# Patient Record
Sex: FEMALE | Race: OTHER | HISPANIC OR LATINO
[De-identification: names, ages, dates, MRNs, and addresses within clinical notes are randomized per-mention and may not be internally consistent; named-entity substitution may affect disease eponyms.]

---

## 2021-09-02 ENCOUNTER — MED ADMIN CHARGE (OUTPATIENT)
Age: 21
End: 2021-09-02

## 2021-09-02 ENCOUNTER — NON-APPOINTMENT (OUTPATIENT)
Age: 21
End: 2021-09-02

## 2021-09-02 ENCOUNTER — APPOINTMENT (OUTPATIENT)
Dept: INTERNAL MEDICINE | Facility: CLINIC | Age: 21
End: 2021-09-02
Payer: MEDICAID

## 2021-09-02 VITALS
BODY MASS INDEX: 29.01 KG/M2 | HEIGHT: 64 IN | DIASTOLIC BLOOD PRESSURE: 76 MMHG | OXYGEN SATURATION: 100 % | RESPIRATION RATE: 16 BRPM | WEIGHT: 169.94 LBS | HEART RATE: 83 BPM | TEMPERATURE: 98.6 F | SYSTOLIC BLOOD PRESSURE: 115 MMHG

## 2021-09-02 DIAGNOSIS — Z23 ENCOUNTER FOR IMMUNIZATION: ICD-10-CM

## 2021-09-02 DIAGNOSIS — J45.20 MILD INTERMITTENT ASTHMA, UNCOMPLICATED: ICD-10-CM

## 2021-09-02 DIAGNOSIS — Z88.9 ALLERGY STATUS TO UNSPECIFIED DRUGS, MEDICAMENTS AND BIOLOGICAL SUBSTANCES: ICD-10-CM

## 2021-09-02 DIAGNOSIS — D64.9 ANEMIA, UNSPECIFIED: ICD-10-CM

## 2021-09-02 DIAGNOSIS — Z82.49 FAMILY HISTORY OF ISCHEMIC HEART DISEASE AND OTHER DISEASES OF THE CIRCULATORY SYSTEM: ICD-10-CM

## 2021-09-02 DIAGNOSIS — Z86.2 PERSONAL HISTORY OF DISEASES OF THE BLOOD AND BLOOD-FORMING ORGANS AND CERTAIN DISORDERS INVOLVING THE IMMUNE MECHANISM: ICD-10-CM

## 2021-09-02 PROCEDURE — 99385 PREV VISIT NEW AGE 18-39: CPT | Mod: GC,25

## 2021-09-02 PROCEDURE — 90471 IMMUNIZATION ADMIN: CPT

## 2021-09-02 PROCEDURE — 90651 9VHPV VACCINE 2/3 DOSE IM: CPT

## 2021-09-02 RX ORDER — ALBUTEROL 90 MCG
90 AEROSOL (GRAM) INHALATION
Refills: 0 | Status: ACTIVE | COMMUNITY

## 2021-09-02 NOTE — ASSESSMENT
[FreeTextEntry1] : 21 year old Lithuanian Female with a past medical history of asthma (never hospitalized or intubated), allergies, anemia  who presents to the clinic to establish care.

## 2021-09-02 NOTE — HISTORY OF PRESENT ILLNESS
[FreeTextEntry1] : Establish Care  [de-identified] : The patient is a 21 year old Pakistani Female with a past medical history of asthma (never hospitalized or intubated), allergies, anemia  who presents to the clinic to establish care.  The patient expresses that her asthma is well controlled and has not used in an inhaler in five months.  The patient states that her diet is, "balanced" with meats, fruits, vegetables, and is western in nature.  The patient does not exercise, but routinely did say COVID happened. The patient has stopped vaping two months ago, but had been vaping once a week prior to that for one and half years. She denies alcohol, or illicit drug use. Ms. Bright is sexually active with one partner and uses barrier contraceptives consistently.   She otherwise report to be in good health with no additional complaints, however states that after walking ten blocks she starts to feel short of breath.

## 2021-09-02 NOTE — ASSESSMENT
[FreeTextEntry1] : 21 year old Macedonian Female with a past medical history of asthma (never hospitalized or intubated), allergies, anemia  who presents to the clinic to establish care.

## 2021-09-02 NOTE — HISTORY OF PRESENT ILLNESS
[FreeTextEntry1] : Establish Care  [de-identified] : The patient is a 21 year old Romanian Female with a past medical history of asthma (never hospitalized or intubated), allergies, anemia  who presents to the clinic to establish care.  The patient expresses that her asthma is well controlled and has not used in an inhaler in five months.  The patient states that her diet is, "balanced" with meats, fruits, vegetables, and is western in nature.  The patient does not exercise, but routinely did say COVID happened. The patient has stopped vaping two months ago, but had been vaping once a week prior to that for one and half years. She denies alcohol, or illicit drug use. Ms. Bright is sexually active with one partner and uses barrier contraceptives consistently.   She otherwise report to be in good health with no additional complaints, however states that after walking ten blocks she starts to feel short of breath.

## 2021-09-02 NOTE — END OF VISIT
[FreeTextEntry3] : I saw and evaluated the patient. The findings and assessment were discussed with the resident and I agree with resident’s plan as documented in the above, in the resident’s note.\par \par Pertinent exam findings: Well-appearing, NAD. \par \par Asthma: Good exercise tolerance. Recently stopped vaping. Start Advair as control med. C/w albuterol PRN\par Anemia: ?Fe def. CBC and iron studies today\par Overweight: CMP today\par Low risk sexual activity\par Vaccines: UTD on MMR, Hep A/B, COVID. HPV #1 today\par \par RTC 2 months for HPV #2.

## 2021-09-07 LAB
ALBUMIN SERPL ELPH-MCNC: 5.1 G/DL
ALP BLD-CCNC: 82 U/L
ALT SERPL-CCNC: 38 U/L
ANION GAP SERPL CALC-SCNC: 14 MMOL/L
AST SERPL-CCNC: 30 U/L
BASOPHILS # BLD AUTO: 0.03 K/UL
BASOPHILS NFR BLD AUTO: 0.5 %
BILIRUB SERPL-MCNC: 0.5 MG/DL
BUN SERPL-MCNC: 12 MG/DL
CALCIUM SERPL-MCNC: 9.8 MG/DL
CHLORIDE SERPL-SCNC: 104 MMOL/L
CO2 SERPL-SCNC: 26 MMOL/L
CREAT SERPL-MCNC: 0.83 MG/DL
EOSINOPHIL # BLD AUTO: 0.13 K/UL
EOSINOPHIL NFR BLD AUTO: 2 %
FERRITIN SERPL-MCNC: 68 NG/ML
GLUCOSE SERPL-MCNC: 94 MG/DL
HCT VFR BLD CALC: 43 %
HGB BLD-MCNC: 13.9 G/DL
HIV1+2 AB SPEC QL IA.RAPID: NONREACTIVE
IMM GRANULOCYTES NFR BLD AUTO: 0.2 %
IRON SATN MFR SERPL: 20 %
IRON SERPL-MCNC: 77 UG/DL
LYMPHOCYTES # BLD AUTO: 2.05 K/UL
LYMPHOCYTES NFR BLD AUTO: 32.2 %
MAN DIFF?: NORMAL
MCHC RBC-ENTMCNC: 28.9 PG
MCHC RBC-ENTMCNC: 32.3 GM/DL
MCV RBC AUTO: 89.4 FL
MONOCYTES # BLD AUTO: 0.58 K/UL
MONOCYTES NFR BLD AUTO: 9.1 %
N GONORRHOEA RRNA SPEC QL NAA+PROBE: NOT DETECTED
NEUTROPHILS # BLD AUTO: 3.57 K/UL
NEUTROPHILS NFR BLD AUTO: 56 %
PLATELET # BLD AUTO: 367 K/UL
POTASSIUM SERPL-SCNC: 4.5 MMOL/L
PROT SERPL-MCNC: 7.8 G/DL
RBC # BLD: 4.81 M/UL
RBC # FLD: 13.5 %
SODIUM SERPL-SCNC: 144 MMOL/L
SOURCE AMPLIFICATION: NORMAL
TIBC SERPL-MCNC: 376 UG/DL
TRANSFERRIN SERPL-MCNC: 290 MG/DL
UIBC SERPL-MCNC: 299 UG/DL
WBC # FLD AUTO: 6.37 K/UL

## 2022-04-07 ENCOUNTER — APPOINTMENT (OUTPATIENT)
Dept: INTERNAL MEDICINE | Facility: CLINIC | Age: 22
End: 2022-04-07

## 2022-04-08 ENCOUNTER — APPOINTMENT (OUTPATIENT)
Dept: INTERNAL MEDICINE | Facility: CLINIC | Age: 22
End: 2022-04-08
Payer: MEDICAID

## 2022-04-08 VITALS
SYSTOLIC BLOOD PRESSURE: 111 MMHG | OXYGEN SATURATION: 98 % | BODY MASS INDEX: 29.02 KG/M2 | HEIGHT: 64 IN | RESPIRATION RATE: 14 BRPM | WEIGHT: 170 LBS | HEART RATE: 82 BPM | TEMPERATURE: 98.5 F | DIASTOLIC BLOOD PRESSURE: 73 MMHG

## 2022-04-08 DIAGNOSIS — J30.2 OTHER SEASONAL ALLERGIC RHINITIS: ICD-10-CM

## 2022-04-08 DIAGNOSIS — J45.909 UNSPECIFIED ASTHMA, UNCOMPLICATED: ICD-10-CM

## 2022-04-08 PROCEDURE — 99213 OFFICE O/P EST LOW 20 MIN: CPT | Mod: GC

## 2022-10-04 ENCOUNTER — APPOINTMENT (OUTPATIENT)
Dept: INTERNAL MEDICINE | Facility: CLINIC | Age: 22
End: 2022-10-04

## 2022-11-21 ENCOUNTER — APPOINTMENT (OUTPATIENT)
Dept: INTERNAL MEDICINE | Facility: CLINIC | Age: 22
End: 2022-11-21

## 2022-11-21 ENCOUNTER — RESULT CHARGE (OUTPATIENT)
Age: 22
End: 2022-11-21

## 2022-11-21 VITALS
HEIGHT: 64 IN | RESPIRATION RATE: 18 BRPM | HEART RATE: 64 BPM | OXYGEN SATURATION: 100 % | TEMPERATURE: 98.3 F | WEIGHT: 161 LBS | BODY MASS INDEX: 27.49 KG/M2 | DIASTOLIC BLOOD PRESSURE: 75 MMHG | SYSTOLIC BLOOD PRESSURE: 118 MMHG

## 2022-11-21 DIAGNOSIS — Z83.3 FAMILY HISTORY OF DIABETES MELLITUS: ICD-10-CM

## 2022-11-21 DIAGNOSIS — Z80.9 FAMILY HISTORY OF MALIGNANT NEOPLASM, UNSPECIFIED: ICD-10-CM

## 2022-11-21 DIAGNOSIS — Z80.42 FAMILY HISTORY OF MALIGNANT NEOPLASM OF PROSTATE: ICD-10-CM

## 2022-11-21 DIAGNOSIS — R42 DIZZINESS AND GIDDINESS: ICD-10-CM

## 2022-11-21 DIAGNOSIS — Z00.01 ENCOUNTER FOR GENERAL ADULT MEDICAL EXAMINATION WITH ABNORMAL FINDINGS: ICD-10-CM

## 2022-11-21 DIAGNOSIS — Z23 ENCOUNTER FOR IMMUNIZATION: ICD-10-CM

## 2022-11-21 DIAGNOSIS — Z12.4 ENCOUNTER FOR SCREENING FOR MALIGNANT NEOPLASM OF CERVIX: ICD-10-CM

## 2022-11-21 PROCEDURE — 90472 IMMUNIZATION ADMIN EACH ADD: CPT

## 2022-11-21 PROCEDURE — 99212 OFFICE O/P EST SF 10 MIN: CPT | Mod: 25,GC

## 2022-11-21 PROCEDURE — 90649 4VHPV VACCINE 3 DOSE IM: CPT

## 2022-11-21 PROCEDURE — 99395 PREV VISIT EST AGE 18-39: CPT | Mod: 25

## 2022-11-21 PROCEDURE — 81025 URINE PREGNANCY TEST: CPT

## 2022-11-21 PROCEDURE — 90686 IIV4 VACC NO PRSV 0.5 ML IM: CPT

## 2022-11-21 PROCEDURE — G0008: CPT

## 2022-11-21 RX ORDER — FLUTICASONE PROPIONATE AND SALMETEROL 50; 100 UG/1; UG/1
100-50 POWDER RESPIRATORY (INHALATION) TWICE DAILY
Qty: 1 | Refills: 3 | Status: ACTIVE | COMMUNITY
Start: 2021-09-02 | End: 1900-01-01

## 2022-11-22 PROBLEM — Z80.42 FAMILY HISTORY OF MALIGNANT NEOPLASM OF PROSTATE: Status: ACTIVE | Noted: 2022-11-22

## 2022-11-22 PROBLEM — Z83.3 FAMILY HISTORY OF DIABETES MELLITUS: Status: ACTIVE | Noted: 2022-11-22

## 2022-11-22 PROBLEM — Z80.9 FAMILY HISTORY OF MALIGNANT NEOPLASM: Status: ACTIVE | Noted: 2022-11-22

## 2022-11-22 LAB — HCG UR QL: NEGATIVE

## 2022-11-29 LAB
ALBUMIN SERPL ELPH-MCNC: 4.6 G/DL
ALP BLD-CCNC: 70 U/L
ALT SERPL-CCNC: 5 U/L
ANION GAP SERPL CALC-SCNC: 11 MMOL/L
AST SERPL-CCNC: 12 U/L
BASOPHILS # BLD AUTO: 0.03 K/UL
BASOPHILS NFR BLD AUTO: 0.5 %
BILIRUB SERPL-MCNC: 0.5 MG/DL
BUN SERPL-MCNC: 9 MG/DL
CALCIUM SERPL-MCNC: 9.8 MG/DL
CHLORIDE SERPL-SCNC: 102 MMOL/L
CO2 SERPL-SCNC: 27 MMOL/L
CREAT SERPL-MCNC: 0.86 MG/DL
EGFR: 99 ML/MIN/1.73M2
EOSINOPHIL # BLD AUTO: 0.13 K/UL
EOSINOPHIL NFR BLD AUTO: 2.2 %
GLUCOSE SERPL-MCNC: 89 MG/DL
HCT VFR BLD CALC: 39.9 %
HGB BLD-MCNC: 12.4 G/DL
IMM GRANULOCYTES NFR BLD AUTO: 0.2 %
LEAD BLD-MCNC: <1 UG/DL
LYMPHOCYTES # BLD AUTO: 1.81 K/UL
LYMPHOCYTES NFR BLD AUTO: 30.5 %
MAN DIFF?: NORMAL
MCHC RBC-ENTMCNC: 28.7 PG
MCHC RBC-ENTMCNC: 31.1 GM/DL
MCV RBC AUTO: 92.4 FL
MONOCYTES # BLD AUTO: 0.52 K/UL
MONOCYTES NFR BLD AUTO: 8.8 %
NEUTROPHILS # BLD AUTO: 3.43 K/UL
NEUTROPHILS NFR BLD AUTO: 57.8 %
PLATELET # BLD AUTO: 303 K/UL
POTASSIUM SERPL-SCNC: 4.6 MMOL/L
PROT SERPL-MCNC: 7.3 G/DL
RBC # BLD: 4.32 M/UL
RBC # FLD: 14.1 %
SODIUM SERPL-SCNC: 140 MMOL/L
TSH SERPL-ACNC: 1.5 UIU/ML
WBC # FLD AUTO: 5.93 K/UL

## 2023-03-08 ENCOUNTER — APPOINTMENT (OUTPATIENT)
Dept: OBGYN | Facility: CLINIC | Age: 23
End: 2023-03-08

## 2023-10-09 ENCOUNTER — APPOINTMENT (OUTPATIENT)
Dept: OBGYN | Facility: CLINIC | Age: 23
End: 2023-10-09
Payer: MEDICAID

## 2023-10-09 ENCOUNTER — NON-APPOINTMENT (OUTPATIENT)
Age: 23
End: 2023-10-09

## 2023-10-09 VITALS — WEIGHT: 151 LBS | SYSTOLIC BLOOD PRESSURE: 110 MMHG | DIASTOLIC BLOOD PRESSURE: 70 MMHG

## 2023-10-09 DIAGNOSIS — Z80.49 FAMILY HISTORY OF MALIGNANT NEOPLASM OF OTHER GENITAL ORGANS: ICD-10-CM

## 2023-10-09 DIAGNOSIS — F17.290 NICOTINE DEPENDENCE, OTHER TOBACCO PRODUCT, UNCOMPLICATED: ICD-10-CM

## 2023-10-09 DIAGNOSIS — Z11.3 ENCOUNTER FOR SCREENING FOR INFECTIONS WITH A PREDOMINANTLY SEXUAL MODE OF TRANSMISSION: ICD-10-CM

## 2023-10-09 DIAGNOSIS — Z00.00 ENCOUNTER FOR GENERAL ADULT MEDICAL EXAMINATION W/OUT ABNORMAL FINDINGS: ICD-10-CM

## 2023-10-09 DIAGNOSIS — Z01.419 ENCOUNTER FOR GYNECOLOGICAL EXAMINATION (GENERAL) (ROUTINE) W/OUT ABNORMAL FINDINGS: ICD-10-CM

## 2023-10-09 DIAGNOSIS — Z30.09 ENCOUNTER FOR OTHER GENERAL COUNSELING AND ADVICE ON CONTRACEPTION: ICD-10-CM

## 2023-10-09 PROCEDURE — 99385 PREV VISIT NEW AGE 18-39: CPT

## 2023-10-09 PROCEDURE — 36415 COLL VENOUS BLD VENIPUNCTURE: CPT

## 2023-10-10 LAB
HBV SURFACE AG SER QL: NONREACTIVE
HCV AB SER QL: NONREACTIVE
HCV S/CO RATIO: 0.09 S/CO
HIV1+2 AB SPEC QL IA.RAPID: NONREACTIVE
T PALLIDUM AB SER QL IA: NEGATIVE

## 2023-10-16 DIAGNOSIS — N76.0 ACUTE VAGINITIS: ICD-10-CM

## 2023-10-16 DIAGNOSIS — B96.89 ACUTE VAGINITIS: ICD-10-CM

## 2023-10-16 DIAGNOSIS — A74.9 CHLAMYDIAL INFECTION, UNSPECIFIED: ICD-10-CM

## 2023-10-16 LAB
A VAGINAE DNA VAG QL NAA+PROBE: ABNORMAL
BVAB2 DNA VAG QL NAA+PROBE: ABNORMAL
C KRUSEI DNA VAG QL NAA+PROBE: NEGATIVE
C TRACH RRNA SPEC QL NAA+PROBE: POSITIVE
CANDIDA DNA VAG QL NAA+PROBE: NEGATIVE
MEGA1 DNA VAG QL NAA+PROBE: ABNORMAL
N GONORRHOEA RRNA SPEC QL NAA+PROBE: NEGATIVE
T VAGINALIS RRNA SPEC QL NAA+PROBE: NEGATIVE

## 2023-10-16 RX ORDER — DOXYCYCLINE 100 MG/1
100 TABLET, FILM COATED ORAL TWICE DAILY
Qty: 14 | Refills: 0 | Status: ACTIVE | COMMUNITY
Start: 2023-10-16 | End: 1900-01-01

## 2023-10-16 RX ORDER — METRONIDAZOLE 7.5 MG/G
0.75 GEL VAGINAL
Qty: 1 | Refills: 0 | Status: ACTIVE | COMMUNITY
Start: 2023-10-16 | End: 1900-01-01

## 2023-10-16 RX ORDER — AZITHROMYCIN 500 MG/1
500 TABLET, FILM COATED ORAL
Qty: 2 | Refills: 0 | Status: ACTIVE | COMMUNITY
Start: 2023-10-16 | End: 1900-01-01

## 2023-10-18 ENCOUNTER — NON-APPOINTMENT (OUTPATIENT)
Age: 23
End: 2023-10-18

## 2023-10-19 ENCOUNTER — LABORATORY RESULT (OUTPATIENT)
Age: 23
End: 2023-10-19

## 2023-11-08 NOTE — PLAN
"----- Message from Laine Torres RN sent at 11/6/2023 11:21 AM CST -----  Regarding: Update with one question.  Dr. Hayes/Staff:    Update only at this time.   Incoming call from Kaiden Saunders received this AM to report pt having "c/o gas", abdominal discomfort. They deny abd bloating/distension. Last BM reported to be on Fri 11/3/2023, "mushy" consistency.    Laine SUN is not eating much. Reports eating some chicken noodle soup from [employer] cereal. She isn't drinking much either-- approx 24 oz water/day ( 1.5 water bottles 16 oz)    Instructed patient to try 4 oz warm prune juice or 2-3 prunes and to start drinking more water/day ie 48 oz (keeping CHF dx in mind), to eat more fruit/vegs and to amb/move around.             Any recommendations for a daily regimen ie Metamucil?    Thank you,  NYA Hill, RN, CCM [facility]  [email or web address]  TEL:  [phone]             "
----- Message from Pat Whitmore sent at 11/7/2023 10:16 AM CST -----  Contact: [phone]  [email or web address]     Patient is calling for Medical Advice regarding: Pt son will like to know if she should take a pro biotic supplement       Would like response via [employer]:  ##call back     Comments:       
Called and spoke to pt's son. Informed him of Dr. Hayes's recommendations. He expressed understanding, also read him the message regarding the Wellbutrin that was sent to his brother    
Sorry for the 2nd message.  I would not recommend a probiotic right now until everything clears up.  It also sounds like the family does not want to start her on Wellbutrin and I think it is best for her not to take the Wellbutrin since we do not know whether that was one of the reasons for the liver abnormalities- so I have taken Wellbutrin off her medication list thank you  
[FreeTextEntry1] : #HCM \par -Blood Pressure - Within normal Range \par -HIV Testing Today \par -Gonorrhea and Chlamydia amplification today \par -CMP\par \par #Asthma \par Asthma well controlled, however patient on long acting inhaler.  She can not recall the name of the medication. \par -Start Advair 100/50\par -Albuterol as needed \par \par #Anemia \par Patient with history of anemia however she does know the specifics of her condition. No kyleigh blood loss. \par -CBC \par -Iron Stuides \par \par 
[FreeTextEntry1] : #HCM \par -Blood Pressure - Within normal Range \par -HIV Testing Today \par -Gonorrhea and Chlamydia amplification today \par -CMP\par \par #Asthma \par Asthma well controlled, however patient on long acting inhaler.  She can not recall the name of the medication. \par -Start Advair 100/50\par -Albuterol as needed \par \par #Anemia \par Patient with history of anemia however she does know the specifics of her condition. No kyleigh blood loss. \par -CBC \par -Iron Stuides \par \par

## 2024-05-09 ENCOUNTER — APPOINTMENT (OUTPATIENT)
Dept: INTERNAL MEDICINE | Facility: CLINIC | Age: 24
End: 2024-05-09
Payer: MEDICAID

## 2024-05-09 VITALS
HEART RATE: 75 BPM | TEMPERATURE: 97.8 F | DIASTOLIC BLOOD PRESSURE: 70 MMHG | SYSTOLIC BLOOD PRESSURE: 106 MMHG | WEIGHT: 162 LBS | OXYGEN SATURATION: 99 % | BODY MASS INDEX: 27.66 KG/M2 | HEIGHT: 64 IN

## 2024-05-09 DIAGNOSIS — F43.9 REACTION TO SEVERE STRESS, UNSPECIFIED: ICD-10-CM

## 2024-05-09 DIAGNOSIS — Z23 ENCOUNTER FOR IMMUNIZATION: ICD-10-CM

## 2024-05-09 DIAGNOSIS — F41.9 ANXIETY DISORDER, UNSPECIFIED: ICD-10-CM

## 2024-05-09 DIAGNOSIS — Z56.6 OTHER PHYSICAL AND MENTAL STRAIN RELATED TO WORK: ICD-10-CM

## 2024-05-09 DIAGNOSIS — G47.9 SLEEP DISORDER, UNSPECIFIED: ICD-10-CM

## 2024-05-09 PROCEDURE — 99214 OFFICE O/P EST MOD 30 MIN: CPT | Mod: 25

## 2024-05-09 PROCEDURE — 90471 IMMUNIZATION ADMIN: CPT

## 2024-05-09 PROCEDURE — 90651 9VHPV VACCINE 2/3 DOSE IM: CPT

## 2024-05-09 PROCEDURE — G0444 DEPRESSION SCREEN ANNUAL: CPT | Mod: 59

## 2024-05-09 PROCEDURE — G2211 COMPLEX E/M VISIT ADD ON: CPT | Mod: NC,1L

## 2024-05-09 SDOH — HEALTH STABILITY - MENTAL HEALTH: OTHER PHYSICAL AND MENTAL STRAIN RELATED TO WORK: Z56.6

## 2024-05-09 NOTE — END OF VISIT
[] : Resident [Time Spent: ___ minutes] : I have spent [unfilled] minutes of time on the encounter. [FreeTextEntry3] : 23F w/asthma, allergies, anemia here for difficulty sleeping.  Insomnia - likely d/t stress from family, work and school. Refer to Ady van for evaluation.  Allergies - OTC meds  HPV vaccine given today

## 2024-05-09 NOTE — HEALTH RISK ASSESSMENT
[PHQ-2 Negative - No further assessment needed] : PHQ-2 Negative - No further assessment needed [Yes] : Yes [Monthly or less (1 pt)] : Monthly or less (1 point) [1 or 2 (0 pts)] : 1 or 2 (0 points) [Never (0 pts)] : Never (0 points) [Former] : Former [2] : 1) Little interest or pleasure doing things for more than half of the days (2) [3] : 2) Feeling down, depressed, or hopeless for nearly every day (3) [1/2 of Days or More (2)] : 1.) Little interest or pleasure in doing things? Half the days or more [Several Days (1)] : 6.) Feeling bad about yourself, or that you are a failure, or have let yourself or your family down? Several days [Nearly Every Day (3)] : 7.) Trouble concentrating on things, such as reading a newspaper or watching television? Nearly every day [Not at All (0)] : 9.) Thoughts that you would be off dead or of hurting yourself in some way? Not at all [Severe] : Severity of Depression is Severe [Extremely Difficult] : How difficult have these problems made it for you to do your work, take care of things at home, or get along with people? Extremely difficult [PHQ-9 Positive] : PHQ-9 Positive [de-identified] : >5 minutes spent  [[id number]] : 20

## 2024-05-09 NOTE — REVIEW OF SYSTEMS
[Muscle Pain] : muscle pain [Back Pain] : back pain [Skin Rash] : skin rash [Insomnia] : insomnia [Anxiety] : anxiety [Negative] : Neurological

## 2024-05-09 NOTE — HISTORY OF PRESENT ILLNESS
[FreeTextEntry1] : Follow up [de-identified] : 23 year old woman with a past medical history of asthma (never hospitalized or intubated), allergies, anemia presenting for follow up.  - Concern for focusing, sleeping, stress that is affecting her in college that has been more significant in the past 4 months. Stress comes from school, work and family. Patient has been experiencing difficulty initiating sleep thinking about stressors, mainly it takes 1-2 hours to fall sleep then has no interruption during the sleep. She reports has significantly difficulty waking up given how tired she feels. She goes to bed depending on her schedule but average 10 pm to 3 am and uses no electronics and all lights off. Patient reports she has difficulty focusing since she was a kid but has been more significant now. Has never been seen by psychologist nor psychiatrist. Has never been treated for anxiety, depression nor ADD.  - Patient had an injury at work 2 month ago. She reports she was lifting many packages that weight around 150 pounds working for [employer]. At that time she went to PCP and orthopedic, for which she perform an XRAY which she was notified that was unremarkable. She was informed it was muscle injury. She was sent to PT that she was doing to. She has been treated with cyclobenzaprine but had her very sleepy and affected college. She also did not notice any improvement with the medication after using it for 1.5 weeks. She has been able to exercise, gaining weight and has limited her walking. For which she has been following with pain management who sent her for MRI that is going to be performed tomorrow.  - Patient reports she is interested in stop vaping multiple times a day.  - Interested in being treated for seasonal allergies. Patient reports recently had a diffuse rash and interested in an allergist evaluation.

## 2024-05-09 NOTE — ASSESSMENT
[FreeTextEntry1] : 23 year old woman with a past medical history of asthma (never hospitalized or intubated), allergies, anemia presenting for follow up.  #anxiety: MUSHTAQ score 14, very difficult to perform her daily activities  #depression PHQ9- 20, extremely difficult to perform daily living - task Ariela High to establish care and refer to psychologist and/or psychiatrist as recommended  - will hold off medications at this time  - can consider Wellbutrin for anxiety/depression and smoking cessation   #work injury of the back - has out of  - will obtain MRI  - tylenol and NSAIDs as needed for pain  - c/w PT  #allergies - c/w over the counter medication as needed - no need for further testing at this time   #HCM - HPV vaccinated x2, provided 3rd dose today - COVID vaccinated x2, but not booster, patient oriented  - Flu not interested - PAP smear 10/2023: ASC-US w negative HPV reflex  RTC f/u as needed or for next annual

## 2024-05-13 ENCOUNTER — TRANSCRIPTION ENCOUNTER (OUTPATIENT)
Age: 24
End: 2024-05-13

## 2024-05-15 ENCOUNTER — TRANSCRIPTION ENCOUNTER (OUTPATIENT)
Age: 24
End: 2024-05-15

## 2024-07-25 ENCOUNTER — EMERGENCY (EMERGENCY)
Facility: HOSPITAL | Age: 24
LOS: 1 days | Discharge: HOME | End: 2024-07-25
Attending: EMERGENCY MEDICINE | Admitting: EMERGENCY MEDICINE
Payer: MEDICAID

## 2024-07-25 VITALS
HEART RATE: 67 BPM | HEIGHT: 64 IN | SYSTOLIC BLOOD PRESSURE: 115 MMHG | DIASTOLIC BLOOD PRESSURE: 76 MMHG | RESPIRATION RATE: 16 BRPM | WEIGHT: 164.91 LBS | TEMPERATURE: 98 F | OXYGEN SATURATION: 100 %

## 2024-07-25 VITALS
OXYGEN SATURATION: 100 % | HEART RATE: 60 BPM | RESPIRATION RATE: 18 BRPM | DIASTOLIC BLOOD PRESSURE: 74 MMHG | SYSTOLIC BLOOD PRESSURE: 112 MMHG

## 2024-07-25 DIAGNOSIS — R42 DIZZINESS AND GIDDINESS: ICD-10-CM

## 2024-07-25 DIAGNOSIS — R06.02 SHORTNESS OF BREATH: ICD-10-CM

## 2024-07-25 DIAGNOSIS — R00.2 PALPITATIONS: ICD-10-CM

## 2024-07-25 DIAGNOSIS — F17.200 NICOTINE DEPENDENCE, UNSPECIFIED, UNCOMPLICATED: ICD-10-CM

## 2024-07-25 DIAGNOSIS — R07.89 OTHER CHEST PAIN: ICD-10-CM

## 2024-07-25 LAB
ANION GAP SERPL CALC-SCNC: 15 MMOL/L — SIGNIFICANT CHANGE UP (ref 5–17)
APTT BLD: 32.7 SEC — SIGNIFICANT CHANGE UP (ref 24.5–35.6)
BASOPHILS # BLD AUTO: 0.03 K/UL — SIGNIFICANT CHANGE UP (ref 0–0.2)
BASOPHILS NFR BLD AUTO: 0.4 % — SIGNIFICANT CHANGE UP (ref 0–2)
BUN SERPL-MCNC: 12 MG/DL — SIGNIFICANT CHANGE UP (ref 7–23)
CALCIUM SERPL-MCNC: 9.3 MG/DL — SIGNIFICANT CHANGE UP (ref 8.4–10.5)
CHLORIDE SERPL-SCNC: 104 MMOL/L — SIGNIFICANT CHANGE UP (ref 96–108)
CK MB CFR SERPL CALC: <1 NG/ML — SIGNIFICANT CHANGE UP (ref 0–6.7)
CK SERPL-CCNC: 106 U/L — SIGNIFICANT CHANGE UP (ref 25–170)
CO2 SERPL-SCNC: 17 MMOL/L — LOW (ref 22–31)
CREAT SERPL-MCNC: 0.81 MG/DL — SIGNIFICANT CHANGE UP (ref 0.5–1.3)
D DIMER BLD IA.RAPID-MCNC: <150 NG/ML DDU — SIGNIFICANT CHANGE UP
EGFR: 105 ML/MIN/1.73M2 — SIGNIFICANT CHANGE UP
EGFR: 105 ML/MIN/1.73M2 — SIGNIFICANT CHANGE UP
EOSINOPHIL # BLD AUTO: 0.16 K/UL — SIGNIFICANT CHANGE UP (ref 0–0.5)
EOSINOPHIL NFR BLD AUTO: 2.3 % — SIGNIFICANT CHANGE UP (ref 0–6)
GLUCOSE SERPL-MCNC: 86 MG/DL — SIGNIFICANT CHANGE UP (ref 70–99)
HCG SERPL-ACNC: <1 MIU/ML — SIGNIFICANT CHANGE UP
HCT VFR BLD CALC: 42.4 % — SIGNIFICANT CHANGE UP (ref 34.5–45)
HGB BLD-MCNC: 13.7 G/DL — SIGNIFICANT CHANGE UP (ref 11.5–15.5)
IMM GRANULOCYTES NFR BLD AUTO: 0.1 % — SIGNIFICANT CHANGE UP (ref 0–0.9)
INR BLD: 1.09 — SIGNIFICANT CHANGE UP (ref 0.85–1.18)
LYMPHOCYTES # BLD AUTO: 2.97 K/UL — SIGNIFICANT CHANGE UP (ref 1–3.3)
LYMPHOCYTES # BLD AUTO: 42.1 % — SIGNIFICANT CHANGE UP (ref 13–44)
MCHC RBC-ENTMCNC: 29 PG — SIGNIFICANT CHANGE UP (ref 27–34)
MCHC RBC-ENTMCNC: 32.3 GM/DL — SIGNIFICANT CHANGE UP (ref 32–36)
MCV RBC AUTO: 89.6 FL — SIGNIFICANT CHANGE UP (ref 80–100)
MONOCYTES # BLD AUTO: 0.52 K/UL — SIGNIFICANT CHANGE UP (ref 0–0.9)
MONOCYTES NFR BLD AUTO: 7.4 % — SIGNIFICANT CHANGE UP (ref 2–14)
NEUTROPHILS # BLD AUTO: 3.36 K/UL — SIGNIFICANT CHANGE UP (ref 1.8–7.4)
NEUTROPHILS NFR BLD AUTO: 47.7 % — SIGNIFICANT CHANGE UP (ref 43–77)
NRBC # BLD: 0 /100 WBCS — SIGNIFICANT CHANGE UP (ref 0–0)
NRBC BLD-RTO: 0 /100 WBCS — SIGNIFICANT CHANGE UP (ref 0–0)
PLATELET # BLD AUTO: 277 K/UL — SIGNIFICANT CHANGE UP (ref 150–400)
POTASSIUM SERPL-MCNC: 4.2 MMOL/L — SIGNIFICANT CHANGE UP (ref 3.5–5.3)
POTASSIUM SERPL-SCNC: 4.2 MMOL/L — SIGNIFICANT CHANGE UP (ref 3.5–5.3)
PROTHROM AB SERPL-ACNC: 12.4 SEC — SIGNIFICANT CHANGE UP (ref 9.5–13)
RBC # BLD: 4.73 M/UL — SIGNIFICANT CHANGE UP (ref 3.8–5.2)
RBC # FLD: 12.3 % — SIGNIFICANT CHANGE UP (ref 10.3–14.5)
SODIUM SERPL-SCNC: 136 MMOL/L — SIGNIFICANT CHANGE UP (ref 135–145)
TROPONIN T, HIGH SENSITIVITY RESULT: <6 NG/L — SIGNIFICANT CHANGE UP (ref 0–51)
WBC # BLD: 7.05 K/UL — SIGNIFICANT CHANGE UP (ref 3.8–10.5)
WBC # FLD AUTO: 7.05 K/UL — SIGNIFICANT CHANGE UP (ref 3.8–10.5)

## 2024-07-25 PROCEDURE — 99285 EMERGENCY DEPT VISIT HI MDM: CPT | Mod: 25

## 2024-07-25 PROCEDURE — 85379 FIBRIN DEGRADATION QUANT: CPT

## 2024-07-25 PROCEDURE — 84484 ASSAY OF TROPONIN QUANT: CPT

## 2024-07-25 PROCEDURE — 71046 X-RAY EXAM CHEST 2 VIEWS: CPT

## 2024-07-25 PROCEDURE — 82553 CREATINE MB FRACTION: CPT

## 2024-07-25 PROCEDURE — 36415 COLL VENOUS BLD VENIPUNCTURE: CPT

## 2024-07-25 PROCEDURE — 80048 BASIC METABOLIC PNL TOTAL CA: CPT

## 2024-07-25 PROCEDURE — 93005 ELECTROCARDIOGRAM TRACING: CPT

## 2024-07-25 PROCEDURE — 99285 EMERGENCY DEPT VISIT HI MDM: CPT

## 2024-07-25 PROCEDURE — 85730 THROMBOPLASTIN TIME PARTIAL: CPT

## 2024-07-25 PROCEDURE — 93010 ELECTROCARDIOGRAM REPORT: CPT

## 2024-07-25 PROCEDURE — 84702 CHORIONIC GONADOTROPIN TEST: CPT

## 2024-07-25 PROCEDURE — 71046 X-RAY EXAM CHEST 2 VIEWS: CPT | Mod: 26

## 2024-07-25 PROCEDURE — 85025 COMPLETE CBC W/AUTO DIFF WBC: CPT

## 2024-07-25 PROCEDURE — 82550 ASSAY OF CK (CPK): CPT

## 2024-07-25 PROCEDURE — 85610 PROTHROMBIN TIME: CPT

## 2024-07-25 RX ADMIN — Medication 1000 MILLILITER(S): at 08:35

## 2024-12-12 ENCOUNTER — EMERGENCY (EMERGENCY)
Facility: HOSPITAL | Age: 24
LOS: 1 days | Discharge: HOME | End: 2024-12-12
Attending: STUDENT IN AN ORGANIZED HEALTH CARE EDUCATION/TRAINING PROGRAM | Admitting: EMERGENCY MEDICINE
Payer: MEDICAID

## 2024-12-12 VITALS
DIASTOLIC BLOOD PRESSURE: 68 MMHG | RESPIRATION RATE: 18 BRPM | TEMPERATURE: 98 F | OXYGEN SATURATION: 98 % | SYSTOLIC BLOOD PRESSURE: 104 MMHG | HEART RATE: 75 BPM

## 2024-12-12 VITALS
TEMPERATURE: 97 F | DIASTOLIC BLOOD PRESSURE: 81 MMHG | RESPIRATION RATE: 18 BRPM | OXYGEN SATURATION: 100 % | HEART RATE: 87 BPM | WEIGHT: 154.98 LBS | HEIGHT: 64 IN | SYSTOLIC BLOOD PRESSURE: 125 MMHG

## 2024-12-12 DIAGNOSIS — Z91.011 ALLERGY TO MILK PRODUCTS: ICD-10-CM

## 2024-12-12 DIAGNOSIS — R10.2 PELVIC AND PERINEAL PAIN: ICD-10-CM

## 2024-12-12 DIAGNOSIS — N93.9 ABNORMAL UTERINE AND VAGINAL BLEEDING, UNSPECIFIED: ICD-10-CM

## 2024-12-12 LAB
ANION GAP SERPL CALC-SCNC: 13 MMOL/L — SIGNIFICANT CHANGE UP (ref 5–17)
APPEARANCE UR: CLEAR — SIGNIFICANT CHANGE UP
BASOPHILS # BLD AUTO: 0.03 K/UL — SIGNIFICANT CHANGE UP (ref 0–0.2)
BASOPHILS NFR BLD AUTO: 0.4 % — SIGNIFICANT CHANGE UP (ref 0–2)
BILIRUB UR-MCNC: NEGATIVE — SIGNIFICANT CHANGE UP
BUN SERPL-MCNC: 14 MG/DL — SIGNIFICANT CHANGE UP (ref 7–23)
CALCIUM SERPL-MCNC: 8.7 MG/DL — SIGNIFICANT CHANGE UP (ref 8.4–10.5)
CHLORIDE SERPL-SCNC: 100 MMOL/L — SIGNIFICANT CHANGE UP (ref 96–108)
CO2 SERPL-SCNC: 23 MMOL/L — SIGNIFICANT CHANGE UP (ref 22–31)
COLOR SPEC: YELLOW — SIGNIFICANT CHANGE UP
CREAT SERPL-MCNC: 0.77 MG/DL — SIGNIFICANT CHANGE UP (ref 0.5–1.3)
DIFF PNL FLD: ABNORMAL
EGFR: 111 ML/MIN/1.73M2 — SIGNIFICANT CHANGE UP
EOSINOPHIL # BLD AUTO: 0.2 K/UL — SIGNIFICANT CHANGE UP (ref 0–0.5)
EOSINOPHIL NFR BLD AUTO: 2.9 % — SIGNIFICANT CHANGE UP (ref 0–6)
GLUCOSE SERPL-MCNC: 101 MG/DL — HIGH (ref 70–99)
GLUCOSE UR QL: NEGATIVE MG/DL — SIGNIFICANT CHANGE UP
HCT VFR BLD CALC: 39 % — SIGNIFICANT CHANGE UP (ref 34.5–45)
HGB BLD-MCNC: 12.8 G/DL — SIGNIFICANT CHANGE UP (ref 11.5–15.5)
IMM GRANULOCYTES NFR BLD AUTO: 0.1 % — SIGNIFICANT CHANGE UP (ref 0–0.9)
KETONES UR-MCNC: NEGATIVE MG/DL — SIGNIFICANT CHANGE UP
LEUKOCYTE ESTERASE UR-ACNC: ABNORMAL
LYMPHOCYTES # BLD AUTO: 2.28 K/UL — SIGNIFICANT CHANGE UP (ref 1–3.3)
LYMPHOCYTES # BLD AUTO: 32.8 % — SIGNIFICANT CHANGE UP (ref 13–44)
MCHC RBC-ENTMCNC: 28.5 PG — SIGNIFICANT CHANGE UP (ref 27–34)
MCHC RBC-ENTMCNC: 32.8 G/DL — SIGNIFICANT CHANGE UP (ref 32–36)
MCV RBC AUTO: 86.9 FL — SIGNIFICANT CHANGE UP (ref 80–100)
MONOCYTES # BLD AUTO: 0.65 K/UL — SIGNIFICANT CHANGE UP (ref 0–0.9)
MONOCYTES NFR BLD AUTO: 9.3 % — SIGNIFICANT CHANGE UP (ref 2–14)
NEUTROPHILS # BLD AUTO: 3.79 K/UL — SIGNIFICANT CHANGE UP (ref 1.8–7.4)
NEUTROPHILS NFR BLD AUTO: 54.5 % — SIGNIFICANT CHANGE UP (ref 43–77)
NITRITE UR-MCNC: NEGATIVE — SIGNIFICANT CHANGE UP
NRBC # BLD: 0 /100 WBCS — SIGNIFICANT CHANGE UP (ref 0–0)
PH UR: 6 — SIGNIFICANT CHANGE UP (ref 5–8)
PLATELET # BLD AUTO: 365 K/UL — SIGNIFICANT CHANGE UP (ref 150–400)
POTASSIUM SERPL-MCNC: 3.7 MMOL/L — SIGNIFICANT CHANGE UP (ref 3.5–5.3)
POTASSIUM SERPL-SCNC: 3.7 MMOL/L — SIGNIFICANT CHANGE UP (ref 3.5–5.3)
PROT UR-MCNC: NEGATIVE MG/DL — SIGNIFICANT CHANGE UP
RBC # BLD: 4.49 M/UL — SIGNIFICANT CHANGE UP (ref 3.8–5.2)
RBC # FLD: 12.8 % — SIGNIFICANT CHANGE UP (ref 10.3–14.5)
SODIUM SERPL-SCNC: 136 MMOL/L — SIGNIFICANT CHANGE UP (ref 135–145)
SP GR SPEC: 1.01 — SIGNIFICANT CHANGE UP (ref 1–1.03)
UROBILINOGEN FLD QL: 0.2 MG/DL — SIGNIFICANT CHANGE UP (ref 0.2–1)
WBC # BLD: 6.96 K/UL — SIGNIFICANT CHANGE UP (ref 3.8–10.5)
WBC # FLD AUTO: 6.96 K/UL — SIGNIFICANT CHANGE UP (ref 3.8–10.5)

## 2024-12-12 PROCEDURE — 87800 DETECT AGNT MULT DNA DIREC: CPT

## 2024-12-12 PROCEDURE — 80048 BASIC METABOLIC PNL TOTAL CA: CPT

## 2024-12-12 PROCEDURE — 87491 CHLMYD TRACH DNA AMP PROBE: CPT

## 2024-12-12 PROCEDURE — 76830 TRANSVAGINAL US NON-OB: CPT | Mod: 26

## 2024-12-12 PROCEDURE — 99284 EMERGENCY DEPT VISIT MOD MDM: CPT | Mod: 25

## 2024-12-12 PROCEDURE — 87591 N.GONORRHOEAE DNA AMP PROB: CPT

## 2024-12-12 PROCEDURE — 81025 URINE PREGNANCY TEST: CPT

## 2024-12-12 PROCEDURE — 36415 COLL VENOUS BLD VENIPUNCTURE: CPT

## 2024-12-12 PROCEDURE — 81001 URINALYSIS AUTO W/SCOPE: CPT

## 2024-12-12 PROCEDURE — 76830 TRANSVAGINAL US NON-OB: CPT

## 2024-12-12 PROCEDURE — 87086 URINE CULTURE/COLONY COUNT: CPT

## 2024-12-12 PROCEDURE — 76856 US EXAM PELVIC COMPLETE: CPT | Mod: 26

## 2024-12-12 PROCEDURE — 85025 COMPLETE CBC W/AUTO DIFF WBC: CPT

## 2024-12-12 PROCEDURE — 76856 US EXAM PELVIC COMPLETE: CPT

## 2024-12-12 PROCEDURE — 99284 EMERGENCY DEPT VISIT MOD MDM: CPT

## 2024-12-12 RX ORDER — ACETAMINOPHEN 500MG 500 MG/1
650 TABLET, COATED ORAL ONCE
Refills: 0 | Status: COMPLETED | OUTPATIENT
Start: 2024-12-12 | End: 2024-12-12

## 2024-12-12 RX ORDER — KETOROLAC TROMETHAMINE 30 MG/ML
15 INJECTION INTRAMUSCULAR; INTRAVENOUS ONCE
Refills: 0 | Status: COMPLETED | OUTPATIENT
Start: 2024-12-12 | End: 2024-12-12

## 2024-12-12 RX ADMIN — ACETAMINOPHEN 500MG 650 MILLIGRAM(S): 500 TABLET, COATED ORAL at 22:47

## 2024-12-12 NOTE — ED PROVIDER NOTE - OBJECTIVE STATEMENT
24 f without signif pmhx c/o pelvic pain and vaginal bleeding. Has had constant, but waxing & waning pain across pelvis for 4 days, left > right.  Light VB/spotting 3 days ago, went for a day without bleeding; yesterday fairly heavy bleeding with clots; today mild bleeding and only required one pad the entire day.    Felt a little warm at times, no documented fever. Mild nausea, no vomiting. No dysuria/hematuria. No diarrhea or blood in stool. Denies other vaginal discharge or concern for STI.    Has never been pregnant.    Menstrual cycles typically 30 days, bleeds for 5.  LMP was 11/16/2024.

## 2024-12-12 NOTE — ED ADULT NURSE NOTE - OBJECTIVE STATEMENT
23 yo female c/o vaginal bleeding, pelvic pain x4 days. Endorses mild nausea, denies vomiting or any other complaints. AAOx4, NAD, ambulatory with steady gait.

## 2024-12-12 NOTE — ED PROVIDER NOTE - CARE PLAN
1 Principal Discharge DX:	Pain, pelvic, female   Principal Discharge DX:	Pain, pelvic, female  Secondary Diagnosis:	Vaginal bleeding

## 2024-12-12 NOTE — ED PROVIDER NOTE - PHYSICAL EXAMINATION
CONSTITUTIONAL: NAD   SKIN: Normal color and turgor.    HEAD: NC/AT.  EYES: Conjunctiva clear. Anicteric sclera.  ENT: Airway clear. Normal voice. MMM.  RESPIRATORY:  Normal respiratory rate and effort.  CARDIOVASCULAR:  RRR   GI:  Abdomen soft, nontender.    : mild tenderness across pelvis. no G/R. No CVAT  Pelvic chaperoned by BOBBI Salguero: small amt blood tinged cervical DC. os closed. No CMT or adnexal mass.    MSK: Neck supple.  No LE edema or calf tenderness. No joint swelling or ROM limitation.  NEURO: Alert, clear mental status.  Speech clear. No focal deficits. Gait steady.

## 2024-12-12 NOTE — ED PROVIDER NOTE - NSFOLLOWUPINSTRUCTIONS_ED_ALL_ED_FT
WHAT YOU NEED TO KNOW:    What is dysmenorrhea? Dysmenorrhea is painful menstrual cramps at or around the time of your monthly period.  Female Reproductive System    What causes dysmenorrhea? Your body normally produces chemicals each month to help your uterus contract. When too many of these chemicals are made, your uterus contracts too much and causes pain. Dysmenorrhea may also be caused by any of the following:    Abnormal structure of your uterus or vagina    A narrow cervix    Growth in or on your uterus or ovaries    Medical conditions, such as pelvic inflammatory disease, endometriosis, or uterine fibroids    A copper intrauterine device (IUD)  What increases my risk for dysmenorrhea?    Never been pregnant    Obesity    Smoking    Family history of painful menstrual cramps    Pelvic infection    Longer monthly period cycle    Medical conditions, such as a sexually transmitted infection or endometriosis  What are the signs and symptoms of dysmenorrhea?    Mild to severe pain    Cramping pain in lower abdomen or low back    Bloating    Headache    Diarrhea  How is dysmenorrhea diagnosed? Your healthcare provider can usually diagnose dysmenorrhea by your signs and symptoms. Tell him or her when your symptoms started and if you have pain between your monthly periods. He or she may ask if anything relieves your pain, such as heat or medicine. Tell your provider if you are sexually active or have ever been pregnant. You may need any of the following:    A blood test will check for pregnancy.    A pelvic exam may be needed to check the size and shape of your uterus and ovaries. Your healthcare provider gently inserts a warmed speculum into your vagina. A speculum is a tool that opens your vagina to show your cervix.    A cervical culture may be needed to check for infection. Your healthcare provider will use a swab to collect a sample of cells from your cervix. This will be sent to a lab for tests.    An ultrasound will show abnormal structure of your reproductive organs. Sound waves are used to show pictures on a monitor.  How is dysmenorrhea treated? Dysmenorrhea can be controlled with lifestyle changes and medicines. It usually improves with age and pregnancy.    Medicines:  NSAIDs help decrease swelling and pain or fever. This medicine is available with or without a doctor's order. NSAIDs can cause stomach bleeding or kidney problems in certain people. If you take blood thinner medicine, always ask your healthcare provider if NSAIDs are safe for you. Always read the medicine label and follow directions.    Birth control medicine may help decrease your pain. This medicine may be birth control pills or an IUD that does not contain copper.    Transcutaneous electric nerve stimulation (TENS), is a device used to stimulate your nerves and decrease pain. Ask your healthcare provider for more information about TENS.  How can I manage my symptoms?    Eat low-fat foods. Increase the amount of vegetables and raw seeds you eat. Ask your healthcare provider if you should take vitamin B or magnesium supplements. These will help decrease your pain. Do not eat dairy products or eggs.    Apply heat on your lower abdomen for 20 to 30 minutes every 2 hours for as many days as directed. Heat helps decrease pain and muscle spasms.    Manage your stress. Stress can make your symptoms worse. Try relaxation exercises, such as deep breathing.    Exercise regularly. Ask your healthcare provider about the best exercise plan for you. Exercise can help decrease pain.   FAMILY WALKING FOR EXERCISE      Keep a record of your pain. Write down when your pain and periods start and stop. Bring the record with you to your follow-up visits.    Do not smoke. Avoid others who smoke. If you smoke, it is never too late to quit. Smoking can increase your risk for dysmenorrhea. Ask your healthcare provider for information if you need help quitting.  When should I contact my healthcare provider?    You have anxiety or feel depressed.    Your periods are early, late, or more painful than usual.    You have questions or concerns about your condition or care.  When should I seek immediate care or call 911?    You have severe pain.    You have heavy vaginal bleeding and you feel faint.    You have sudden chest pain and trouble breathing.

## 2024-12-12 NOTE — ED PROVIDER NOTE - CLINICAL SUMMARY MEDICAL DECISION MAKING FREE TEXT BOX
Few days vaginal bleeding with pelvic pain.  Mildly tender across pelvic region without G/R.  Some blood tinged cervical DC noted on exam, but pt without complaints of vaginal DC, and there was no CMT - low suspicion for PID.  Will swab for BV-yeast-trich and test urine for GC-NG, though she does not require empiric treatment. Had a negative pregnancy test at urgent care today.   Will check basic labs, urine preg test to r/o ectopic preg, UA with reflex culture - would not treat for UTI based on UA as she has no urinary complaints.   TVUS to eval for ovarian cyst/torsion (low suspicion for torsion at this time), fibroids, other.

## 2024-12-12 NOTE — ED PROVIDER NOTE - PROGRESS NOTE DETAILS
pt prefers not getting empirically treated for STD. will wait for result sign out to Dr Whiting    vaginal bleeding/pelvic. pending TVUS

## 2024-12-12 NOTE — ED PROVIDER NOTE - ATTENDING APP SHARED VISIT CONTRIBUTION OF CARE
I performed a significant portion of the history , exam, and medical decision making for this patient. I agree with PA's note and assessment as above, except as otherwise edited directly in the note, documented in this section, and/or as documented in ED course and updates section.

## 2024-12-12 NOTE — ED ADULT TRIAGE NOTE - CHIEF COMPLAINT QUOTE
"I have had vaginal bleeding since Monday. I am not supposed to start my menstrual cycle for 14 days." Reports pelvic pain with nausea. Had negative pregnancy test at urgent care today. Denies shortness of breath, dizziness, urinary symptoms, vomiting, diarrhea. Pt not on birth control.

## 2024-12-12 NOTE — ED PROVIDER NOTE - PATIENT PORTAL LINK FT
You can access the FollowMyHealth Patient Portal offered by [facility] by registering at the following website: [email or web address]. By joining [employer]s FollowMyHealth portal, you will also be able to view your health information using other applications (apps) compatible with our system.

## 2024-12-13 LAB
CANDIDA AB TITR SER: SIGNIFICANT CHANGE UP
G VAGINALIS DNA SPEC QL NAA+PROBE: DETECTED
T VAGINALIS SPEC QL WET PREP: SIGNIFICANT CHANGE UP

## 2024-12-16 LAB
C TRACH RRNA SPEC QL NAA+PROBE: DETECTED
N GONORRHOEA RRNA SPEC QL NAA+PROBE: SIGNIFICANT CHANGE UP
SPECIMEN SOURCE: SIGNIFICANT CHANGE UP

## 2025-04-23 ENCOUNTER — EMERGENCY (EMERGENCY)
Facility: HOSPITAL | Age: 25
LOS: 1 days | End: 2025-04-23
Attending: STUDENT IN AN ORGANIZED HEALTH CARE EDUCATION/TRAINING PROGRAM | Admitting: STUDENT IN AN ORGANIZED HEALTH CARE EDUCATION/TRAINING PROGRAM
Payer: MEDICAID

## 2025-04-23 VITALS
OXYGEN SATURATION: 99 % | DIASTOLIC BLOOD PRESSURE: 82 MMHG | RESPIRATION RATE: 18 BRPM | HEIGHT: 64 IN | WEIGHT: 160.06 LBS | HEART RATE: 79 BPM | TEMPERATURE: 99 F | SYSTOLIC BLOOD PRESSURE: 128 MMHG

## 2025-04-23 VITALS
SYSTOLIC BLOOD PRESSURE: 114 MMHG | RESPIRATION RATE: 18 BRPM | TEMPERATURE: 99 F | DIASTOLIC BLOOD PRESSURE: 76 MMHG | HEART RATE: 75 BPM | OXYGEN SATURATION: 99 %

## 2025-04-23 DIAGNOSIS — Z91.011 ALLERGY TO MILK PRODUCTS: ICD-10-CM

## 2025-04-23 DIAGNOSIS — R10.2 PELVIC AND PERINEAL PAIN: ICD-10-CM

## 2025-04-23 DIAGNOSIS — R42 DIZZINESS AND GIDDINESS: ICD-10-CM

## 2025-04-23 DIAGNOSIS — Z3A.01 LESS THAN 8 WEEKS GESTATION OF PREGNANCY: ICD-10-CM

## 2025-04-23 DIAGNOSIS — O99.891 OTHER SPECIFIED DISEASES AND CONDITIONS COMPLICATING PREGNANCY: ICD-10-CM

## 2025-04-23 LAB
ALBUMIN SERPL ELPH-MCNC: 4.6 G/DL — SIGNIFICANT CHANGE UP (ref 3.3–5)
ALP SERPL-CCNC: 69 U/L — SIGNIFICANT CHANGE UP (ref 40–120)
ALT FLD-CCNC: 10 U/L — SIGNIFICANT CHANGE UP (ref 10–45)
ANION GAP SERPL CALC-SCNC: 10 MMOL/L — SIGNIFICANT CHANGE UP (ref 5–17)
APPEARANCE UR: CLEAR — SIGNIFICANT CHANGE UP
AST SERPL-CCNC: 14 U/L — SIGNIFICANT CHANGE UP (ref 10–40)
BASOPHILS # BLD AUTO: 0.04 K/UL — SIGNIFICANT CHANGE UP (ref 0–0.2)
BASOPHILS NFR BLD AUTO: 0.5 % — SIGNIFICANT CHANGE UP (ref 0–2)
BILIRUB SERPL-MCNC: 0.3 MG/DL — SIGNIFICANT CHANGE UP (ref 0.2–1.2)
BILIRUB UR-MCNC: NEGATIVE — SIGNIFICANT CHANGE UP
BLD GP AB SCN SERPL QL: NEGATIVE — SIGNIFICANT CHANGE UP
BUN SERPL-MCNC: 12 MG/DL — SIGNIFICANT CHANGE UP (ref 7–23)
CALCIUM SERPL-MCNC: 9.4 MG/DL — SIGNIFICANT CHANGE UP (ref 8.4–10.5)
CHLORIDE SERPL-SCNC: 106 MMOL/L — SIGNIFICANT CHANGE UP (ref 96–108)
CO2 SERPL-SCNC: 24 MMOL/L — SIGNIFICANT CHANGE UP (ref 22–31)
COLOR SPEC: YELLOW — SIGNIFICANT CHANGE UP
CREAT SERPL-MCNC: 0.76 MG/DL — SIGNIFICANT CHANGE UP (ref 0.5–1.3)
DIFF PNL FLD: NEGATIVE — SIGNIFICANT CHANGE UP
EGFR: 113 ML/MIN/1.73M2 — SIGNIFICANT CHANGE UP
EGFR: 113 ML/MIN/1.73M2 — SIGNIFICANT CHANGE UP
EOSINOPHIL # BLD AUTO: 0.12 K/UL — SIGNIFICANT CHANGE UP (ref 0–0.5)
EOSINOPHIL NFR BLD AUTO: 1.4 % — SIGNIFICANT CHANGE UP (ref 0–6)
GLUCOSE SERPL-MCNC: 76 MG/DL — SIGNIFICANT CHANGE UP (ref 70–99)
GLUCOSE UR QL: NEGATIVE MG/DL — SIGNIFICANT CHANGE UP
HCG SERPL-ACNC: 1043 MIU/ML — HIGH
HCT VFR BLD CALC: 38.5 % — SIGNIFICANT CHANGE UP (ref 34.5–45)
HGB BLD-MCNC: 13.2 G/DL — SIGNIFICANT CHANGE UP (ref 11.5–15.5)
IMM GRANULOCYTES NFR BLD AUTO: 0.2 % — SIGNIFICANT CHANGE UP (ref 0–0.9)
KETONES UR-MCNC: NEGATIVE MG/DL — SIGNIFICANT CHANGE UP
LEUKOCYTE ESTERASE UR-ACNC: ABNORMAL
LIDOCAIN IGE QN: 26 U/L — SIGNIFICANT CHANGE UP (ref 7–60)
LYMPHOCYTES # BLD AUTO: 2.2 K/UL — SIGNIFICANT CHANGE UP (ref 1–3.3)
LYMPHOCYTES # BLD AUTO: 26 % — SIGNIFICANT CHANGE UP (ref 13–44)
MCHC RBC-ENTMCNC: 29.4 PG — SIGNIFICANT CHANGE UP (ref 27–34)
MCHC RBC-ENTMCNC: 34.3 G/DL — SIGNIFICANT CHANGE UP (ref 32–36)
MCV RBC AUTO: 85.7 FL — SIGNIFICANT CHANGE UP (ref 80–100)
MONOCYTES # BLD AUTO: 0.8 K/UL — SIGNIFICANT CHANGE UP (ref 0–0.9)
MONOCYTES NFR BLD AUTO: 9.5 % — SIGNIFICANT CHANGE UP (ref 2–14)
NEUTROPHILS # BLD AUTO: 5.27 K/UL — SIGNIFICANT CHANGE UP (ref 1.8–7.4)
NEUTROPHILS NFR BLD AUTO: 62.4 % — SIGNIFICANT CHANGE UP (ref 43–77)
NITRITE UR-MCNC: NEGATIVE — SIGNIFICANT CHANGE UP
NRBC BLD AUTO-RTO: 0 /100 WBCS — SIGNIFICANT CHANGE UP (ref 0–0)
PH UR: 5.5 — SIGNIFICANT CHANGE UP (ref 5–8)
PLATELET # BLD AUTO: 348 K/UL — SIGNIFICANT CHANGE UP (ref 150–400)
POTASSIUM SERPL-MCNC: 4 MMOL/L — SIGNIFICANT CHANGE UP (ref 3.5–5.3)
POTASSIUM SERPL-SCNC: 4 MMOL/L — SIGNIFICANT CHANGE UP (ref 3.5–5.3)
PROT SERPL-MCNC: 7.7 G/DL — SIGNIFICANT CHANGE UP (ref 6–8.3)
PROT UR-MCNC: NEGATIVE MG/DL — SIGNIFICANT CHANGE UP
RBC # BLD: 4.49 M/UL — SIGNIFICANT CHANGE UP (ref 3.8–5.2)
RBC # FLD: 13.1 % — SIGNIFICANT CHANGE UP (ref 10.3–14.5)
RH IG SCN BLD-IMP: POSITIVE — SIGNIFICANT CHANGE UP
RH IG SCN BLD-IMP: POSITIVE — SIGNIFICANT CHANGE UP
SODIUM SERPL-SCNC: 140 MMOL/L — SIGNIFICANT CHANGE UP (ref 135–145)
SP GR SPEC: 1.02 — SIGNIFICANT CHANGE UP (ref 1–1.03)
UROBILINOGEN FLD QL: 0.2 MG/DL — SIGNIFICANT CHANGE UP (ref 0.2–1)
WBC # BLD: 8.45 K/UL — SIGNIFICANT CHANGE UP (ref 3.8–10.5)
WBC # FLD AUTO: 8.45 K/UL — SIGNIFICANT CHANGE UP (ref 3.8–10.5)

## 2025-04-23 PROCEDURE — 84702 CHORIONIC GONADOTROPIN TEST: CPT

## 2025-04-23 PROCEDURE — 80053 COMPREHEN METABOLIC PANEL: CPT

## 2025-04-23 PROCEDURE — 86900 BLOOD TYPING SEROLOGIC ABO: CPT

## 2025-04-23 PROCEDURE — 83690 ASSAY OF LIPASE: CPT

## 2025-04-23 PROCEDURE — 76817 TRANSVAGINAL US OBSTETRIC: CPT

## 2025-04-23 PROCEDURE — 85025 COMPLETE CBC W/AUTO DIFF WBC: CPT

## 2025-04-23 PROCEDURE — 76801 OB US < 14 WKS SINGLE FETUS: CPT

## 2025-04-23 PROCEDURE — 87801 DETECT AGNT MULT DNA AMPLI: CPT

## 2025-04-23 PROCEDURE — 87491 CHLMYD TRACH DNA AMP PROBE: CPT

## 2025-04-23 PROCEDURE — 76817 TRANSVAGINAL US OBSTETRIC: CPT | Mod: 26

## 2025-04-23 PROCEDURE — 76801 OB US < 14 WKS SINGLE FETUS: CPT | Mod: 26

## 2025-04-23 PROCEDURE — 87661 TRICHOMONAS VAGINALIS AMPLIF: CPT

## 2025-04-23 PROCEDURE — 86901 BLOOD TYPING SEROLOGIC RH(D): CPT

## 2025-04-23 PROCEDURE — 99285 EMERGENCY DEPT VISIT HI MDM: CPT

## 2025-04-23 PROCEDURE — 81001 URINALYSIS AUTO W/SCOPE: CPT

## 2025-04-23 PROCEDURE — 36415 COLL VENOUS BLD VENIPUNCTURE: CPT

## 2025-04-23 PROCEDURE — 99284 EMERGENCY DEPT VISIT MOD MDM: CPT | Mod: 25

## 2025-04-23 PROCEDURE — 87798 DETECT AGENT NOS DNA AMP: CPT

## 2025-04-23 PROCEDURE — 87591 N.GONORRHOEAE DNA AMP PROB: CPT

## 2025-04-23 PROCEDURE — 86850 RBC ANTIBODY SCREEN: CPT

## 2025-04-25 ENCOUNTER — EMERGENCY (EMERGENCY)
Facility: HOSPITAL | Age: 25
LOS: 1 days | End: 2025-04-25
Attending: STUDENT IN AN ORGANIZED HEALTH CARE EDUCATION/TRAINING PROGRAM | Admitting: STUDENT IN AN ORGANIZED HEALTH CARE EDUCATION/TRAINING PROGRAM
Payer: MEDICAID

## 2025-04-25 VITALS
DIASTOLIC BLOOD PRESSURE: 77 MMHG | TEMPERATURE: 98 F | RESPIRATION RATE: 16 BRPM | HEART RATE: 89 BPM | SYSTOLIC BLOOD PRESSURE: 126 MMHG | OXYGEN SATURATION: 98 %

## 2025-04-25 VITALS
HEART RATE: 78 BPM | RESPIRATION RATE: 16 BRPM | SYSTOLIC BLOOD PRESSURE: 105 MMHG | OXYGEN SATURATION: 100 % | HEIGHT: 64 IN | WEIGHT: 160.06 LBS | DIASTOLIC BLOOD PRESSURE: 69 MMHG | TEMPERATURE: 99 F

## 2025-04-25 LAB
ANION GAP SERPL CALC-SCNC: 12 MMOL/L — SIGNIFICANT CHANGE UP (ref 5–17)
APPEARANCE UR: CLEAR — SIGNIFICANT CHANGE UP
BILIRUB UR-MCNC: NEGATIVE — SIGNIFICANT CHANGE UP
BUN SERPL-MCNC: 9 MG/DL — SIGNIFICANT CHANGE UP (ref 7–23)
CALCIUM SERPL-MCNC: 9.3 MG/DL — SIGNIFICANT CHANGE UP (ref 8.4–10.5)
CHLORIDE SERPL-SCNC: 102 MMOL/L — SIGNIFICANT CHANGE UP (ref 96–108)
CO2 SERPL-SCNC: 22 MMOL/L — SIGNIFICANT CHANGE UP (ref 22–31)
COLOR SPEC: YELLOW — SIGNIFICANT CHANGE UP
CREAT SERPL-MCNC: 0.7 MG/DL — SIGNIFICANT CHANGE UP (ref 0.5–1.3)
DIFF PNL FLD: NEGATIVE — SIGNIFICANT CHANGE UP
EGFR: 125 ML/MIN/1.73M2 — SIGNIFICANT CHANGE UP
EGFR: 125 ML/MIN/1.73M2 — SIGNIFICANT CHANGE UP
GLUCOSE SERPL-MCNC: 85 MG/DL — SIGNIFICANT CHANGE UP (ref 70–99)
GLUCOSE UR QL: NEGATIVE MG/DL — SIGNIFICANT CHANGE UP
HCG SERPL-ACNC: 2768 MIU/ML — HIGH
HCT VFR BLD CALC: 37.9 % — SIGNIFICANT CHANGE UP (ref 34.5–45)
HGB BLD-MCNC: 12.9 G/DL — SIGNIFICANT CHANGE UP (ref 11.5–15.5)
KETONES UR-MCNC: NEGATIVE MG/DL — SIGNIFICANT CHANGE UP
LEUKOCYTE ESTERASE UR-ACNC: ABNORMAL
MCHC RBC-ENTMCNC: 28.9 PG — SIGNIFICANT CHANGE UP (ref 27–34)
MCHC RBC-ENTMCNC: 34 G/DL — SIGNIFICANT CHANGE UP (ref 32–36)
MCV RBC AUTO: 84.8 FL — SIGNIFICANT CHANGE UP (ref 80–100)
NITRITE UR-MCNC: NEGATIVE — SIGNIFICANT CHANGE UP
NRBC BLD AUTO-RTO: 0 /100 WBCS — SIGNIFICANT CHANGE UP (ref 0–0)
PH UR: 6.5 — SIGNIFICANT CHANGE UP (ref 5–8)
PLATELET # BLD AUTO: 338 K/UL — SIGNIFICANT CHANGE UP (ref 150–400)
POTASSIUM SERPL-MCNC: 3.8 MMOL/L — SIGNIFICANT CHANGE UP (ref 3.5–5.3)
POTASSIUM SERPL-SCNC: 3.8 MMOL/L — SIGNIFICANT CHANGE UP (ref 3.5–5.3)
PROT UR-MCNC: NEGATIVE MG/DL — SIGNIFICANT CHANGE UP
RBC # BLD: 4.47 M/UL — SIGNIFICANT CHANGE UP (ref 3.8–5.2)
RBC # FLD: 13.2 % — SIGNIFICANT CHANGE UP (ref 10.3–14.5)
SODIUM SERPL-SCNC: 136 MMOL/L — SIGNIFICANT CHANGE UP (ref 135–145)
SP GR SPEC: 1.01 — SIGNIFICANT CHANGE UP (ref 1–1.03)
UROBILINOGEN FLD QL: 0.2 MG/DL — SIGNIFICANT CHANGE UP (ref 0.2–1)
WBC # BLD: 7.69 K/UL — SIGNIFICANT CHANGE UP (ref 3.8–10.5)
WBC # FLD AUTO: 7.69 K/UL — SIGNIFICANT CHANGE UP (ref 3.8–10.5)

## 2025-04-25 PROCEDURE — 99284 EMERGENCY DEPT VISIT MOD MDM: CPT | Mod: 25

## 2025-04-25 PROCEDURE — 80048 BASIC METABOLIC PNL TOTAL CA: CPT

## 2025-04-25 PROCEDURE — 99285 EMERGENCY DEPT VISIT HI MDM: CPT

## 2025-04-25 PROCEDURE — 76801 OB US < 14 WKS SINGLE FETUS: CPT | Mod: 26

## 2025-04-25 PROCEDURE — 84702 CHORIONIC GONADOTROPIN TEST: CPT

## 2025-04-25 PROCEDURE — 76801 OB US < 14 WKS SINGLE FETUS: CPT

## 2025-04-25 PROCEDURE — 85027 COMPLETE CBC AUTOMATED: CPT

## 2025-04-25 PROCEDURE — 36415 COLL VENOUS BLD VENIPUNCTURE: CPT

## 2025-04-25 PROCEDURE — 87086 URINE CULTURE/COLONY COUNT: CPT

## 2025-04-25 PROCEDURE — 76817 TRANSVAGINAL US OBSTETRIC: CPT

## 2025-04-25 PROCEDURE — 96372 THER/PROPH/DIAG INJ SC/IM: CPT

## 2025-04-25 PROCEDURE — 81001 URINALYSIS AUTO W/SCOPE: CPT

## 2025-04-25 PROCEDURE — 76817 TRANSVAGINAL US OBSTETRIC: CPT | Mod: 26

## 2025-04-25 RX ORDER — AZITHROMYCIN 250 MG
1000 CAPSULE ORAL ONCE
Refills: 0 | Status: COMPLETED | OUTPATIENT
Start: 2025-04-25 | End: 2025-04-25

## 2025-04-25 RX ORDER — CEFTRIAXONE 500 MG/1
500 INJECTION, POWDER, FOR SOLUTION INTRAMUSCULAR; INTRAVENOUS ONCE
Refills: 0 | Status: COMPLETED | OUTPATIENT
Start: 2025-04-25 | End: 2025-04-25

## 2025-04-25 RX ADMIN — Medication 1000 MILLIGRAM(S): at 21:20

## 2025-04-25 RX ADMIN — CEFTRIAXONE 500 MILLIGRAM(S): 500 INJECTION, POWDER, FOR SOLUTION INTRAMUSCULAR; INTRAVENOUS at 21:21

## 2025-04-25 NOTE — ED PROVIDER NOTE - NSFOLLOWUPINSTRUCTIONS_ED_ALL_ED_FT
Threatened Miscarriage: What to Know  You may have vaginal bleeding in the first 20 weeks of pregnancy. If bleeding happens during this time, your health care provider may run certain tests. The condition may be called a threatened miscarriage if the tests show:  That you're still pregnant.  That your unborn baby (fetus) is still growing.  That your baby has a heart beat.  A threatened miscarriage does not mean your pregnancy will end. But it increases your risk for a miscarriage.    What are the causes?  The cause of this condition is usually not known.    What increases the risk?  The following factors may make a pregnant person more likely to have a miscarriage:    Certain medical conditions    Hormone conditions, such as thyroid disease or polycystic ovary syndrome (PCOS).  Diabetes.  Autoimmune disorders.  Infections.  Bleeding disorders.  Obesity.  Lifestyle factors    Smoking, vaping, or using other products with tobacco or nicotine, or being exposed to tobacco smoke.  Drinking alcohol.  Drinking large amounts of caffeine. Ask your provider how much caffeine is safe for you.  Using drugs.  Problems with organs, structures, or the pregnancy    Cervical insufficiency. This is when the cervix opens and thins before pregnancy is at term.  Problems with the lining or tissue in your uterus.  Abnormal growths, called fibroids, in the uterus.  Problems with the baby's genetic material, called chromosomes.  Infection.  Personal or medical history    Injury. This is rare.  Having had a miscarriage before.  Being younger than age 18.  Being older than age 35.  Exposure to harmful substances in the environment.  Using certain medicines.  History of blood clots.  What are the signs or symptoms?  Spotting or bleeding in the vagina. This is with or without cramps or pain.  Mild pain or cramps in your belly.  How is this diagnosed?  A person lying on an exam table. A provider runs an ultrasound device over their belly.  You may have tests to check if you're still pregnant. These tests will be done if you have bleeding, with or without pain, in your belly before the 20th week of pregnancy. These tests include:  Ultrasound.  A physical exam.  Checking your baby's heart beat.  Lab tests, such as blood tests, pee tests, or swabs for infection.  You may be diagnosed with a threatened miscarriage if:  Ultrasound testing shows that you're still pregnant.  Your baby's heart rate is strong.  A physical exam shows that your cervix is closed.  Blood tests confirm that you're still pregnant.  How is this treated?  No treatments have been shown to prevent a threatened miscarriage from going on to a complete miscarriage. However, the right home care is important.    Follow these instructions at home:  Get plenty of rest.  Do not have sex, douche, or put anything in your vagina, such as tampons, until your provider says it's OK.  Do not smoke, vape, or use nicotine or tobacco.  Do not drink alcohol.  Do not use drugs.  Avoid caffeine.  Keep all follow-up prenatal visits. Your provider will advise you on next steps.  Contact a health care provider if:  You have light vaginal bleeding or spotting while pregnant.  You have pain or cramping in your belly.  You have a fever.  Get help right away if:  You have heavy bleeding that soaks through 2 large pads an hour for more than 2 hours.  You have tissue or blood clots coming out of your vagina.  You leak fluid, or you have a gush of fluid from your vagina.  You have very bad cramps or pain in your back or belly.  These symptoms may be an emergency. Call 911 right away.  Do not wait to see if the symptoms will go away.  Do not drive yourself to the hospital.

## 2025-04-25 NOTE — CONSULT NOTE ADULT - ASSESSMENT
23 yo  LMP 3/19 presents for BHCG check, endorsing lower pelvic pain and subjective fevers; however, in ED, patient afebrile and hemodynamically stable with very benign exam (as above). Labs without leukocytosis, anemia. Concern is for miscarriage vs ectopic pregnancy vs pelvic infectious process vs gastrointestinal process.     - f/u BHCG, UA  - f/u TVUS  - GYN following    Incomplete note at this time 25 yo  LMP 3/19 at 5w2d presents for [facility] check, endorsing lower pelvic pain and subjective fevers; however, in ED, patient afebrile and hemodynamically stable with very benign exam (as above). Labs without leukocytosis, anemia. Concern is for miscarriage vs ectopic pregnancy vs pelvic infectious process vs gastrointestinal process. BHCG trend 1043 to 2768, and TVUS with gestational sac plus yolk sac measuring 5w0d. Confirmed IUP at this time.     - Due to continued vaginal discharge, nonspecific pelvic pain, and previous exam with inflamed appearing cervix, will purse empiric treatment for cervicitis at this time  - Minimal concern for miscarriage or ectopic pregnancy at this time given uptrend in BHCG and US findings  - Lower concern for gastrointestinal or urinary process due to lack of systemic symptoms, seemingly normal bowel function, and urinalysis wnl  - Administer PO azithromycin 1g once, and IM ceftriaxone 500mg once  - Patient has confirmed IUP, so should start prenatal vitamin daily and avoid alcohol/vaping  - Patient should follow up with OBGYN at her earliest convenience, one suggestion is Dr Violet Randolph ([phone])  - Pending vaginitis panel sent   - Pending urine culture sent     GL PGY2  Discussed with PGY4 Dr Graham and attending Dr Warren

## 2025-04-25 NOTE — CONSULT NOTE ADULT - SUBJECTIVE AND OBJECTIVE BOX
LIZETTE GANDHI     [id number]  25 yo  LMP 3/19 presents for BHCG check. Patient seen 2 days ago with lower abdominal pain, found to have BHCG 1043, desired pregnancy. Today, patient endorses    Patient denies fever, chills, chest pain, SOB, abdominal pain, nausea, vomiting, vaginal bleeding    OB: G1 current  GYN: denies  PMH: denies  PSH: denies  MEDS: denies  ALL: NKDA  SH: ; one male partner; student; lives in [facility]; alcohol use 1/day, daily vaping tobacco, no drug use      PHYSICAL EXAM:   T(C): 37 (25 @ 17:18), Max: 37 (- @ 17:18)  HR: 78 (25 @ 17:18) (78 - 78)  BP: 105/69 (25 @ 17:18) (105/69 - 105/69)  RR: 16 (25 @ 17:18) (16 - 16)  SpO2: 100% (25 @ 17:18) (100% - 100%)    **************************  Constitutional: No acute distress  Cardiovascular: regular rate and rhythm  Respiratory: Clear to ausculation bilaterally; no wheezing, rhonchi, or crackles  Abdomen: soft, non tender, positive bowel sounds, no rebound or guarding   Extremities: no calf tenderness or swelling bilaterally  SSE:  Bimanual:      LABS:                    RADIOLOGY & ADDITIONAL STUDIES: LIZETTE GANDHI     [id number]  23 yo  [facility]. [facility] 2 days ago with lower abdominal pain, found to have BHCG 1043, desired pregnancy. Today, patient endorses for 2 weeks a lower abdominal pain, 10/10 that is nonspecifically localized to the pelvis, constant. She denies pain radiating to any other part of the body. Endorses subjective fever at home consistently yet hasnot checked a body temp. She is able to ambulate, void spontaneously, and she passes regular flatus and BMs daily. Patient denies chills, chest pain, SOB, abdominal pain, nausea, vomiting, vaginal bleeding, vaginal discharge/itching/irritation    OB: G1 current  GYN: denies  PMH: denies  PSH: denies  MEDS: denies  ALL: NKDA  SH: ; one male partner; student; lives in [facility]; alcohol use 1/day, daily vaping tobacco, no drug use      PHYSICAL EXAM:   T(C): 37 (25 @ 17:18), Max: 37 (25 @ 17:18)  HR: 78 (25 @ 17:18) (78 - 78)  BP: 105/69 (25 @ 17:18) (105/69 - 105/69)  RR: 16 (25 @ 17:18) (16 - 16)  SpO2: 100% (25 @ 17:18) (100% - 100%)    **************************  Constitutional: No acute distress  Cardiovascular: regular rate and rhythm  Respiratory: Clear to ausculation bilaterally; no wheezing, rhonchi, or crackles  Abdomen: soft, mild tenderness in suprapubic area, positive bowel sounds, no rebound or guarding   Extremities: no calf tenderness or swelling bilaterally  SSE: normal external genitalia; normal vaginal mucosa; small amount of thin white discharge; cervix visualized, appears closed, without bleeding or lesions; when touched with scopette, no tenderness or bleeding elicited  Bimanual: declines      LABS:                        12.9   7.69  )-----------( 338      ( 2025 17:45 )             37.9       136  |  102  |  9   ----------------------------<  85  3.8   |  22  |  0.70  Ca    9.3      2025 17:45                RADIOLOGY & ADDITIONAL STUDIES: LIZETTE GANDHI     [id number]  23 yo  [facility]. [facility] with lower abdominal pain, found to have BHCG 1043, desired pregnancy. Today, patient endorses for 2 weeks a lower abdominal pain, 10/10 that is nonspecifically localized to the pelvis, constant. She denies pain radiating to any other part of the body. Endorses subjective fever at home consistently yet hasnot checked a body temp. She is able to ambulate, void spontaneously, and she passes regular flatus and BMs daily. Patient denies chills, chest pain, SOB, abdominal pain, nausea, vomiting, vaginal bleeding, vaginal discharge/itching/irritation    OB: G1 current  GYN: denies  PMH: denies  PSH: denies  MEDS: denies  ALL: NKDA  SH: ; one male partner; student; lives in [facility]; alcohol use 1/day, daily vaping tobacco, no drug use      PHYSICAL EXAM:   T(C): 37 (25 @ 17:18), Max: 37 (25 @ 17:18)  HR: 78 (25 @ 17:18) (78 - 78)  BP: 105/69 (25 @ 17:18) (105/69 - 105/69)  RR: 16 (25 @ 17:18) (16 - 16)  SpO2: 100% (25 @ 17:18) (100% - 100%)    **************************  Constitutional: No acute distress  Cardiovascular: regular rate and rhythm  Respiratory: Clear to ausculation bilaterally; no wheezing, rhonchi, or crackles  Abdomen: soft, mild tenderness in suprapubic area, positive bowel sounds, no rebound or guarding   Extremities: no calf tenderness or swelling bilaterally  SSE: normal external genitalia; normal vaginal mucosa; small amount of thin white discharge; cervix visualized, appears closed, without bleeding or lesions; when touched with scopette, no tenderness or bleeding elicited  Bimanual: declines      LABS:                        12.9   7.69  )-----------( 338      ( 2025 17:45 )             37.9       136  |  102  |  9   ----------------------------<  85  3.8   |  22  |  0.70  Ca    9.3      2025 17:45    HCG Quantitative, Serum: 2768    Urinalysis Basic - ( 2025 20:46 )  Color: Yellow / Appearance: Clear / S.007 / pH: x  Gluc: x / Ketone: Negative mg/dL  / Bili: Negative / Urobili: 0.2 mg/dL   Blood: x / Protein: Negative mg/dL / Nitrite: Negative   Leuk Esterase: Small / RBC: x / WBC x   Sq Epi: x / Non Sq Epi: x / Bacteria: x          RADIOLOGY & ADDITIONAL STUDIES:

## 2025-04-25 NOTE — ED PROVIDER NOTE - OBJECTIVE STATEMENT
25 yo  LMP 3/19 p/w repeated HCG. pt was seen in the ED 2 days for pelvic pain > 1 week, found to be pregnant. HCg 1000's. TVUS showed pregnancy of unclear etiology. since 2 days ago, patient described no new acute change in symptoms. continued to have intermittent pelvic pain. no vaginal bleeding/discharge    vaginitis panel was drawn 2 days ago but not in record?

## 2025-04-25 NOTE — ED PROVIDER NOTE - PROGRESS NOTE DETAILS
spoke with gyn team Dr Wilkes. IUP on TVUS. recommends ceftriaxone 500mg IM and azithromycin 1g PO to empirically treat for possible STI. pt to fu with outpt gyn provider. strict return precaution given. pt agrees and understands plan. will dc

## 2025-04-25 NOTE — ED PROVIDER NOTE - CLINICAL SUMMARY MEDICAL DECISION MAKING FREE TEXT BOX
here with pelvic pain and pregnancy of unclear etiology. will repeat hcg and TVUS. consider repeat vaginitis panel. otherwise no acute change in exam compared to prior. pain is likely due to pregnancy. low suspicion for other acute intrabdominal emergencies at this time. labs, TVUS. gyn consult. reassess. pt declines pain control at this time.

## 2025-04-25 NOTE — ED ADULT NURSE NOTE - OBJECTIVE STATEMENT
Pharmacy also requesting Potassium CL 20mg   Pt presents to the ED for repeat HCG. Per pt, last menstrual period is on 03/19/2025. On arrival, pt is alert and oriented, ambulatory, denies vaginal bleeding, dizziness, or weakness.

## 2025-04-25 NOTE — ED PROVIDER NOTE - CARE PROVIDER_API CALL
Violet Randolph  Obstetrics and Gynecology  [address]  [place] [address]  Phone: [phone]  Fax: [phone]  Follow Up Time:

## 2025-04-25 NOTE — ED PROVIDER NOTE - PATIENT PORTAL LINK FT
You can access the FollowMyHealth Patient Portal offered by [facility] by registering at the following website: [email or web address]. By joining [employer]’s FollowMyHealth portal, you will also be able to view your health information using other applications (apps) compatible with our system.

## 2025-04-25 NOTE — ED ADULT TRIAGE NOTE - MODE OF ARRIVAL
Called patient and verified her name and date of birth. I informed patient per the provider that yes she can take the Medrol dose Pack and Percocet together. But do not take the the medrol dose pack and anti-inflammatories together. Patient states she is taking her last pill from the steroid pack tonight. Patient verbalized understanding. No further action needed at this time. Walk in

## 2025-04-25 NOTE — ED ADULT TRIAGE NOTE - CHIEF COMPLAINT QUOTE
Pt presents for repeat hcg after bloodwork and US performed earlier this week. First pregnancy: LMP on 03/19/2025, US showed pregnancy of unknown origin, HCG of ~ 1000 last visit. Denies vaginal bleeding, dizziness, or weakness. Reports continuation/persistence of pelvic pain which has been ongoing x 2 weeks. Otherwise denies new symptoms today.

## 2025-04-26 RX ORDER — METRONIDAZOLE 250 MG
1 TABLET ORAL
Qty: 14 | Refills: 0
Start: 2025-04-26 | End: 2025-05-02

## 2025-04-26 RX ORDER — AZITHROMYCIN 250 MG
2 CAPSULE ORAL
Qty: 2 | Refills: 0
Start: 2025-04-26 | End: 2025-04-26

## 2025-04-26 NOTE — CHART NOTE - NSCHARTNOTEFT_GEN_A_CORE
Spoke to pt 4/26 about vagintis panel showing bacterial vaginosis and chlamydia. Explained she already received tx for chlamydia in ED with azithromycin 1g PO. Explained she needs partner treatment and to abstain from sexual intercourse for 7-14 days; pt in understanding and is opting for expedited partner treatment - sent azithromycin 1g PO to pharmacy to administer to partner. Explained she will need follow up test of cure with primary OB (was not able to call for appt yet because office closed over the weekend). Explained that we recommend treatment of BV in pregnancy, sent flagyl to pharmacy, all questions answered.   CHRISTINA PGY2

## 2025-04-28 DIAGNOSIS — Z3A.01 LESS THAN 8 WEEKS GESTATION OF PREGNANCY: ICD-10-CM

## 2025-04-28 DIAGNOSIS — O20.0 THREATENED ABORTION: ICD-10-CM

## 2025-04-28 DIAGNOSIS — Z91.011 ALLERGY TO MILK PRODUCTS: ICD-10-CM
